# Patient Record
Sex: FEMALE | Race: WHITE
[De-identification: names, ages, dates, MRNs, and addresses within clinical notes are randomized per-mention and may not be internally consistent; named-entity substitution may affect disease eponyms.]

---

## 2022-08-10 ENCOUNTER — HOSPITAL ENCOUNTER (OUTPATIENT)
Dept: HOSPITAL 47 - RADCTMAIN | Age: 67
Discharge: HOME | End: 2022-08-10
Attending: FAMILY MEDICINE
Payer: MEDICARE

## 2022-08-10 DIAGNOSIS — R51.9: Primary | ICD-10-CM

## 2022-08-10 PROCEDURE — 70450 CT HEAD/BRAIN W/O DYE: CPT

## 2022-08-10 NOTE — CT
EXAMINATION TYPE: CT brain wo con

CT DLP: 1072.3 mGycm, Automated exposure control for dose reduction was used.

 

DATE OF EXAM: 8/10/2022 7:40 AM

 

COMPARISON: None.

 

CLINICAL INDICATION:Female, 67 years old with history of R51.9 headache, 

 

TECHNIQUE: 

Brain: Axial CT images of the brain were obtained with coronal and sagittal reformats created and rev
iewed.

Contrast used: None.

Oral contrast used: None.

 

FINDINGS:

 

Brain:

Extra-axial spaces: No abnormal extra-axial fluid collections.

Ventricular system: Within normal limits

Cerebral parenchyma: No acute intraparenchymal hemorrhage or mass effect.  The gray-white junction is
 well differentiated. Empty sella morphology.

Cerebellum: Unremarkable. No definitive tonsillar ectopia.

Mass effect: No evidence of midline shift.

Intracranial vasculature: unremarkable

Soft tissues: Normal.

Calvarium/osseous structures: No depressed skull fracture.

Paranasal sinuses and mastoid air cells: Clear

Visualized orbits: Orbital contents are intact. No definitive evidence of papilledema.

 

 

IMPRESSION:

*  No acute intracranial process.

*  Empty sella morphology without definitive tonsillar ectopia or papilledema.